# Patient Record
(demographics unavailable — no encounter records)

---

## 2025-02-03 NOTE — CONSULT LETTER
[Dear  ___] : Dear  [unfilled], [( Thank you for referring [unfilled] for consultation for _____ )] : Thank you for referring [unfilled] for consultation for [unfilled] [Please see my note below.] : Please see my note below. [Consult Closing:] : Thank you very much for allowing me to participate in the care of this patient.  If you have any questions, please do not hesitate to contact me. [Sincerely,] : Sincerely, [FreeTextEntry3] : YeouChing Hsu, MD Division of Pediatric Endocrinology [facility]  of Pediatrics [facility]

## 2025-02-03 NOTE — PAST MEDICAL HISTORY
[At Term] : at term [Normal Vaginal Route] : by normal vaginal route [None] : there were no delivery complications [Age Appropriate] : age appropriate developmental milestones met [FreeTextEntry1] : 2.93 kg on birth card

## 2025-02-03 NOTE — FAMILY HISTORY
[___ cm] : [unfilled] centimeters [___ inches] : [unfilled] inches [FreeTextEntry5] : about 11 -12 years of age [FreeTextEntry4] : reportedly MGM >160 cm,   cm, PGM similar to mother, and PGF about 167 cm

## 2025-02-03 NOTE — HISTORY OF PRESENT ILLNESS
[Headaches] : no headaches [Polyuria] : no polyuria [Polydipsia] : no polydipsia [Constipation] : no constipation [Fatigue] : no fatigue [Abdominal Pain] : no abdominal pain [FreeTextEntry2] : SANDRA RAM is a now 8-year-3-month-old male who presents today referred by pediatrician secondary to concern of growth. Mother speaking English well, and they also speak Occitan.  Mother agreed that he has been growing, however has been slow. She voiced that the recent visit they had concern he did not grow at all. She voiced that the pair of pants he was wearing he has been wearing since he was 6 years of age, however, she agrees they are looking short on him.  Mother states that he does not eat well, very little bit. He eats however almost everything.  Otherwise He has been in good health. SANDRA eats a normal diet, denies any abdominal pain, or any headaches.  Growth chart showed height seemingly steady just about 25-35%ile, most recent visit dropped to 17%ile.